# Patient Record
Sex: FEMALE | NOT HISPANIC OR LATINO | URBAN - METROPOLITAN AREA
[De-identification: names, ages, dates, MRNs, and addresses within clinical notes are randomized per-mention and may not be internally consistent; named-entity substitution may affect disease eponyms.]

---

## 2019-12-06 ENCOUNTER — COMMUNICATION - HEALTHEAST (OUTPATIENT)
Dept: SCHEDULING | Facility: CLINIC | Age: 74
End: 2019-12-06

## 2020-01-07 ENCOUNTER — AMBULATORY - HEALTHEAST (OUTPATIENT)
Dept: CARDIAC REHAB | Facility: CLINIC | Age: 75
End: 2020-01-07

## 2020-01-07 DIAGNOSIS — Z95.5 STENTED CORONARY ARTERY: ICD-10-CM

## 2020-01-14 ENCOUNTER — AMBULATORY - HEALTHEAST (OUTPATIENT)
Dept: CARDIAC REHAB | Facility: CLINIC | Age: 75
End: 2020-01-14

## 2020-01-14 DIAGNOSIS — I21.4 NSTEMI (NON-ST ELEVATED MYOCARDIAL INFARCTION) (H): ICD-10-CM

## 2020-01-14 DIAGNOSIS — Z95.5 STENTED CORONARY ARTERY: ICD-10-CM

## 2020-01-14 RX ORDER — MECLIZINE HYDROCHLORIDE 25 MG/1
25 TABLET ORAL 2 TIMES DAILY
Status: SHIPPED | COMMUNITY
Start: 2020-01-14

## 2020-01-21 ENCOUNTER — AMBULATORY - HEALTHEAST (OUTPATIENT)
Dept: CARDIAC REHAB | Facility: CLINIC | Age: 75
End: 2020-01-21

## 2020-01-21 DIAGNOSIS — I21.4 NSTEMI (NON-ST ELEVATED MYOCARDIAL INFARCTION) (H): ICD-10-CM

## 2020-01-21 DIAGNOSIS — Z95.5 STENTED CORONARY ARTERY: ICD-10-CM

## 2020-01-23 ENCOUNTER — AMBULATORY - HEALTHEAST (OUTPATIENT)
Dept: CARDIAC REHAB | Facility: CLINIC | Age: 75
End: 2020-01-23

## 2020-01-23 DIAGNOSIS — Z95.5 STENTED CORONARY ARTERY: ICD-10-CM

## 2020-01-23 DIAGNOSIS — I21.4 NSTEMI (NON-ST ELEVATED MYOCARDIAL INFARCTION) (H): ICD-10-CM

## 2020-01-27 ENCOUNTER — AMBULATORY - HEALTHEAST (OUTPATIENT)
Dept: CARDIAC REHAB | Facility: CLINIC | Age: 75
End: 2020-01-27

## 2020-01-27 DIAGNOSIS — I21.4 NSTEMI (NON-ST ELEVATED MYOCARDIAL INFARCTION) (H): ICD-10-CM

## 2020-01-27 DIAGNOSIS — Z95.5 STENTED CORONARY ARTERY: ICD-10-CM

## 2020-02-05 ENCOUNTER — AMBULATORY - HEALTHEAST (OUTPATIENT)
Dept: CARDIAC REHAB | Facility: CLINIC | Age: 75
End: 2020-02-05

## 2020-02-05 DIAGNOSIS — I21.4 NSTEMI (NON-ST ELEVATED MYOCARDIAL INFARCTION) (H): ICD-10-CM

## 2020-02-05 DIAGNOSIS — Z95.5 STENTED CORONARY ARTERY: ICD-10-CM

## 2021-02-27 ENCOUNTER — RECORDS - HEALTHEAST (OUTPATIENT)
Dept: LAB | Facility: CLINIC | Age: 76
End: 2021-02-27

## 2021-02-28 ENCOUNTER — RECORDS - HEALTHEAST (OUTPATIENT)
Dept: LAB | Facility: CLINIC | Age: 76
End: 2021-02-28

## 2021-03-01 LAB
ALBUMIN UR-MCNC: NEGATIVE MG/DL
APPEARANCE UR: ABNORMAL
BACTERIA #/AREA URNS HPF: ABNORMAL HPF
BILIRUB UR QL STRIP: NEGATIVE
CAOX CRY #/AREA URNS HPF: ABNORMAL /[HPF]
COLOR UR AUTO: YELLOW
GLUCOSE UR STRIP-MCNC: NEGATIVE MG/DL
HGB UR QL STRIP: NEGATIVE
KETONES UR STRIP-MCNC: NEGATIVE MG/DL
LEUKOCYTE ESTERASE UR QL STRIP: ABNORMAL
MUCOUS THREADS #/AREA URNS LPF: ABNORMAL LPF
NITRATE UR QL: NEGATIVE
PH UR STRIP: 8 [PH] (ref 4.5–8)
RBC #/AREA URNS AUTO: ABNORMAL HPF
SP GR UR STRIP: 1.01 (ref 1–1.03)
SQUAMOUS #/AREA URNS AUTO: ABNORMAL LPF
TRI-PHOS CRY #/AREA URNS HPF: PRESENT /[HPF]
UROBILINOGEN UR STRIP-ACNC: ABNORMAL
WBC #/AREA URNS AUTO: ABNORMAL HPF
WBC CLUMPS #/AREA URNS HPF: PRESENT /[HPF]

## 2021-03-02 LAB
ANION GAP SERPL CALCULATED.3IONS-SCNC: 14 MMOL/L (ref 5–18)
BUN SERPL-MCNC: 9 MG/DL (ref 8–28)
CALCIUM SERPL-MCNC: 9.3 MG/DL (ref 8.5–10.5)
CHLORIDE BLD-SCNC: 97 MMOL/L (ref 98–107)
CO2 SERPL-SCNC: 26 MMOL/L (ref 22–31)
CREAT SERPL-MCNC: 0.75 MG/DL (ref 0.6–1.1)
ERYTHROCYTE [DISTWIDTH] IN BLOOD BY AUTOMATED COUNT: 14.3 % (ref 11–14.5)
FERRITIN SERPL-MCNC: 236 NG/ML (ref 10–130)
GFR SERPL CREATININE-BSD FRML MDRD: >60 ML/MIN/1.73M2
GLUCOSE BLD-MCNC: 179 MG/DL (ref 70–125)
HCT VFR BLD AUTO: 29.7 % (ref 35–47)
HGB BLD-MCNC: 9.4 G/DL (ref 12–16)
IRON SERPL-MCNC: 31 UG/DL (ref 42–175)
MCH RBC QN AUTO: 28.1 PG (ref 27–34)
MCHC RBC AUTO-ENTMCNC: 31.6 G/DL (ref 32–36)
MCV RBC AUTO: 89 FL (ref 80–100)
PLATELET # BLD AUTO: 566 THOU/UL (ref 140–440)
PMV BLD AUTO: 9 FL (ref 8.5–12.5)
POTASSIUM BLD-SCNC: 3.1 MMOL/L (ref 3.5–5)
RBC # BLD AUTO: 3.34 MILL/UL (ref 3.8–5.4)
SODIUM SERPL-SCNC: 137 MMOL/L (ref 136–145)
VIT B12 SERPL-MCNC: 591 PG/ML (ref 213–816)
WBC: 14.4 THOU/UL (ref 4–11)

## 2021-03-03 LAB
BACTERIA SPEC CULT: ABNORMAL
BACTERIA SPEC CULT: ABNORMAL

## 2021-03-08 ENCOUNTER — RECORDS - HEALTHEAST (OUTPATIENT)
Dept: LAB | Facility: CLINIC | Age: 76
End: 2021-03-08

## 2021-03-09 LAB
ANION GAP SERPL CALCULATED.3IONS-SCNC: 11 MMOL/L (ref 5–18)
BUN SERPL-MCNC: 10 MG/DL (ref 8–28)
CALCIUM SERPL-MCNC: 8.9 MG/DL (ref 8.5–10.5)
CHLORIDE BLD-SCNC: 103 MMOL/L (ref 98–107)
CO2 SERPL-SCNC: 26 MMOL/L (ref 22–31)
CREAT SERPL-MCNC: 0.57 MG/DL (ref 0.6–1.1)
ERYTHROCYTE [DISTWIDTH] IN BLOOD BY AUTOMATED COUNT: 15 % (ref 11–14.5)
GFR SERPL CREATININE-BSD FRML MDRD: >60 ML/MIN/1.73M2
GLUCOSE BLD-MCNC: 112 MG/DL (ref 70–125)
HCT VFR BLD AUTO: 29.1 % (ref 35–47)
HGB BLD-MCNC: 9 G/DL (ref 12–16)
MCH RBC QN AUTO: 27.8 PG (ref 27–34)
MCHC RBC AUTO-ENTMCNC: 30.9 G/DL (ref 32–36)
MCV RBC AUTO: 90 FL (ref 80–100)
PLATELET # BLD AUTO: 416 THOU/UL (ref 140–440)
PMV BLD AUTO: 9 FL (ref 8.5–12.5)
POTASSIUM BLD-SCNC: 3.8 MMOL/L (ref 3.5–5)
RBC # BLD AUTO: 3.24 MILL/UL (ref 3.8–5.4)
SODIUM SERPL-SCNC: 140 MMOL/L (ref 136–145)
WBC: 7.3 THOU/UL (ref 4–11)

## 2021-06-04 VITALS — WEIGHT: 209 LBS | BODY MASS INDEX: 38.23 KG/M2

## 2021-06-04 VITALS — WEIGHT: 209.7 LBS | BODY MASS INDEX: 38.35 KG/M2

## 2021-06-04 VITALS — BODY MASS INDEX: 38.23 KG/M2 | WEIGHT: 209 LBS

## 2021-06-04 VITALS — BODY MASS INDEX: 37.31 KG/M2 | WEIGHT: 204 LBS

## 2021-06-05 ENCOUNTER — RECORDS - HEALTHEAST (OUTPATIENT)
Dept: MEDSURG UNIT | Facility: CLINIC | Age: 76
End: 2021-06-05

## 2021-06-05 DIAGNOSIS — R07.9 CHEST PAIN: ICD-10-CM

## 2021-06-05 NOTE — PROGRESS NOTES
ITP ASSESSMENT   Assessment Day: 30 Day    Session Number: 7  Precautions: Standard cardiac sx, asthma, low back pain, arthritis    Diagnosis: MI;Stent    Risk Stratification: High    Referring Provider: Camila Bernard MD  EXERCISE  Exercise Assessment: Discharge       Did not complete 6 min walk as pt has been in rehab so few sessions.                         Exercise Plan  Goals Next 30 days  ADL'S: Goal #1: Increase MET level to 2-3 to feel strong enough to return to attending [employer] once/week.    Leisure: Goal #2: Exercise at home 2x/week for 10-15 mins.    Work: Goal #3: Increase MET level to 4-5 to comfortably carry grocery bags and provide some caretaker duties for her  (put on his socks).      Education Goals: Patient can state cardiac s/s and appropriate emergency response.    Education Goals Met: Has system for taking medication.;Medication review.                          Goals Met  Initial ADL's goals met: Goal met- pt has improved to 2.2 METs on Nustep, and has resumed attending [employer] services.    Initial Leisure goals met: Goal met- pt has been exercising 2x/week at home using Nustep, bike, or walking.    Intial Work goals met: Goal not met- pt has not reached peak of 4-5 METs, but is able to put on her husbands     Initial Progression: Pt has been limited by low back pain.  She reached peak of 2.2 METs on Nustep for 25 mins.  She is d/c CR early because of a copay.      Exercise Prescription  Exercise Mode: Bike;Nustep;Arm Erg.;Treadmill;Hallway Walking    Frequency: 2x/week    Duration: 25-40 mins    Intensity / THR: 20-30 beats above resting heart rate    RPE 11-14  Progression / Met level: 2-2.5    Resistive Training?: Yes      Current Exercise (mins/week): 60      Interventions  Home Exercise:  Mode: Walking, biking, Nustep    Frequency: 2-4x/week    Duration: 15-25 mins      Education Material : Educational videos;Provide written material;Individual education and counseling;Offer  educational classes      Education Completed  Exercise Education Completed: Cardiac Anatomy;Signs and Symptoms;Medication review;RPE;Emergency Plan;Home Exercise;Warm up/cool down;FITT Principles;BP/HR Reponse to exercise;Benefits of Exercise;End point of exercise              Exercise Follow-up/Discharge  Follow up/Discharge: Pt has reached peak of            NUTRITION  Nutrition Assessment: Discharge      Nutrition Risk Factors:  Nutrition Risk Factors: Dyslipidemia;Overweight  Cholesterol: 152 (12/2/2019)  LDL: 74  HDL: 58  Triglycerides: 99      Nutrition Plan  Interventions  No data recorded  Other Nutrition Intervention: Therapist/Pt Discussion;Educational Videos;Provide with Written Material      Education Completed  Nutrition Education Completed: Risk factor overview      Goals  Nutrition Goals (Next 30 days): Patient can identify their risk factors for CAD;Patient will follow a low sodium diet;Patient knows appropriate portion size;Patient will lose weight      Goals Met  Nutrition Goals Met: Completed Nutritional Risk Screen;Provided Rate your Plate Survey;Reviewed Dietitian schedule      Height, Weight, and  BMI  Weight: 204 lb (92.5 kg)  Height: 5' (1.524 m)  BMI: 39.84      Nutrition Follow-up  Follow-up/Discharge: Pt is the cook of the household and does the grocery shopping.  Pt has lost about 5 lbs since starting cardiac rehab.  She is exercising more at home.  Pt to try to meet with dietician in the next couple weeks.       Other Risk Factors  Other Risk Factor Assessment: Discharge      HTN Risk Factor: Hypertension      Pre Exercise BP: 112/70  Post Exercise BP: 106/76      Hypertension Plan  Goals  HTN Goals: Patient demonstrates understanding of HTN, no goals identified for the next 30 days      Goals Met  HTN Goals Met: Take medication as prescribed;Follow low sodium diet;Exercises regularly      HTN Interventions  HTN Interventions: Therapist/patient discussion;Provide written material;Offer  educational videos;Offer educational classes      HTN Education Completed  HTN Education Completed: Medication review;Risk factor overview      Tobacco Risk Factor: NA        Risk Factor Follow-up   Follow-up/Discharge: Pt continues to take BP meds and limit her sodium intake.  She has been exercising more at home.         PSYCHOSOCIAL  Psychosocial Assessment: Discharge    Did not complete post-surveys as pt has been here so few sessions.     Psychosocial Risk Factor: NA      Psychosocial Plan  Interventions  Interventions: Offer educational videos and classes;Provide written material;Individual education and counseling      Education Completed  Education Completed: Relaxation/Coping Techniques      Goals  Goals (Next 30 days): Identify stressors;Improvement in [employer] COOP score;Practicing stress management skills      Goals Met  Goals Met: Identified Support system;Oriented to stress management classes      Psychosocial Follow-up  Follow-up/Discharge: Pt continues to deny stress.  She has resumed attending weekly [employer] services.           Patient involved in Goal setting?: Yes      Signature: _____________________________________________________________    Date: __________________    Time: __________________

## 2021-06-05 NOTE — PROGRESS NOTES
Cardiac Rehab  Phase II Assessment    Assessment Date: 1/7/20    Diagnosis: NSTEMI  Date of Onset: 12/1/19  Procedure: PCI with BRAD  Date of Onset: 12/1/19, 12/19/19  ICD/Pacemaker: No Parameters: NA  Post-op Complications: Pt needed a second stent because of some in-stent stenosis from initial stent  ECG History: SR EF%:45-50, per echo 12/2/19  Past Medical History: asthma; GERD; lifetime non-smoker; no DM; spinal stenosis; chronic low back pain; s/p back surgery; s/p TKR (R knee); s/p L knee pain, partial replacement; arthritis    Physical Assessment  Precautions/ Physical Limitations: chronic low back pain  Oxygen: No  O2 Sats: 97 Lung Sounds: clear Edema: none  Incisions: clean/dry/intact  Sleeping Pattern: fair   Appetite: good   Nutrition Risk Screen: Completed.    Pain  Location: chronic low back pain  Characteristics:chronic ache  Intensity: (0-10 scale) 5  Current Pain Management: meds (Tylenol)  Intervention: rest, meds  Response: slightly decrease to 3-4/10    Psychosocial/ Emotional Health  1. In the past 12 months, have you been in a relationship where you have been abused physically, emotionally, sexually or financially? No  notified: NA  2. Who do you turn to for emotional support?:   3. Do you have cultural or spiritual needs? No  4. Have there been any major life changes in the past 12 months? No    Referral Information  Primary Physician: Camila Bernard MD; [facility]  Cardiologist: Dr. Raza  Surgeon: Dr. Jass Craft    Home exercise/Equipment: recumbent bike    Patient's long-term goal(s): Return to pre-procedure activities, establish ex habit    1. Living Accommodations: [facility] Steps: No      Support people at home: no, friends/family members live close by;  lives with pt but he cannot provide physical support as pt is his caregiver   2. Marital Status:   3. Family is able to assist with cares      Restorationist/Community involvement: attends  regularly  4. Recreation/Hobbies: Sewing, crafts

## 2021-06-05 NOTE — PROGRESS NOTES
ITP ASSESSMENT   Assessment Day: Initial    Session Number: 1/2  Precautions: Standard cardiac sx, asthma, low back pain, arthritis    Diagnosis: MI;Stent    Risk Stratification: High    Referring Provider: Camila Bernard MD  EXERCISE  Exercise Assessment: Initial       6 Minute Walk Test   Pre   Pre Exercise HR: 87                    Pre Exercise BP: 130/70      Peak  Peak HR: 129                   Peak BP: 154/66    Peak feet: 600    Peak O2 SAT: 97    Peak RPE: 8    Peak MPH: 1.14      Symptoms:  Peak Symptoms: 4/10 low back pain, SOB, fatigue      5 mins. Post  5 Min Post HR: 88    5 Min Post BP: 120/64                           Exercise Plan  Goals Next 30 days  ADL'S: Goal #1: Increase MET level to 2-3 to feel strong enough to return to attending [employer] once/week.    Leisure: Goal #2: Exercise at home 2x/week for 10-15 mins.    Work: Goal #3: Increase MET level to 4-5 to comfortably carry grocery bags and provide some caretaker duties for her  (put on his socks).      Initial Progression: Pt will be limited by chronic low back pain, obesity.      Education Goals: Patient can state cardiac s/s and appropriate emergency response.    Education Goals Met: Has system for taking medication.;Medication review.      Exercise Prescription  Exercise Mode: Bike;Nustep;Arm Erg.;Treadmill;Hallway Walking    Frequency: 2x/week    Duration: 25-40 mins    Intensity / THR: 20-30 beats above resting heart rate    RPE 11-14  Progression / Met level: 2-2.5    Resistive Training?: Yes      Current Exercise (mins/week): 5      Interventions  Home Exercise:  Mode: Walking, biking    Frequency: 2-3x/week    Duration: 10-15 mins      Education Material : Educational videos;Provide written material;Individual education and counseling;Offer educational classes      Education Completed  Exercise Education Completed: Cardiac Anatomy;Signs and Symptoms;Medication review;RPE;Emergency Plan;Home Exercise;Warm up/cool down;FITT  Principles;BP/HR Reponse to exercise;Benefits of Exercise;End point of exercise              Exercise Follow-up/Discharge  Follow up/Discharge: Skilled therapy-   NUTRITION  Nutrition Assessment: Initial      Nutrition Risk Factors:  Nutrition Risk Factors: Dyslipidemia;Overweight  Cholesterol: 152 (12/2/2019)  LDL: 74  HDL: 58  Triglycerides: 99      Nutrition Plan  Other Nutrition Intervention: Therapist/Pt Discussion;Educational Videos;Provide with Written Material      Education Completed  Nutrition Education Completed: Risk factor overview      Goals  Nutrition Goals (Next 30 days): Patient can identify their risk factors for CAD;Patient will follow a low sodium diet;Patient knows appropriate portion size;Patient will lose weight      Goals Met  Nutrition Goals Met: Completed Nutritional Risk Screen;Provided Rate your Plate Survey;Reviewed Dietitian schedule      Height, Weight, and  BMI  Weight: 209 lb 11.2 oz (95.1 kg)  Height: 5' (1.524 m)  BMI: 40.95      Nutrition Follow-up  Follow-up/Discharge: Pt is the cook of the household and does the grocery shopping.  Diet survey given to pt.  She reports she does not add salt when preparing food (but is not necessarily reading labels to see how much salt is in foods).  She reports eating fruits and vegetables every day.         Other Risk Factors  Other Risk Factor Assessment: Initial      HTN Risk Factor: Hypertension      Pre Exercise BP: 130/70  Post Exercise BP: 120/64      Hypertension Plan  Goals  HTN Goals: Follow low sodium diet;Exercises regularly      Goals Met  HTN Goals Met: Take medication as prescribed      HTN Interventions  HTN Interventions: Therapist/patient discussion;Provide written material;Offer educational videos;Offer educational classes      HTN Education Completed  HTN Education Completed: Medication review;Risk factor overview      Tobacco Risk Factor: NA          Risk Factor Follow-up   Follow-up/Discharge: Pt has not been adding salt to  her food.  She takes meds for HTN.  Pt has been encouraged to exercise regularly at home.     PSYCHOSOCIAL  Psychosocial Assessment: Initial       [facility] COOP Q of L Summary Score: 29      PHQ-9 Total Score: 3      Psychosocial Risk Factor: NA      Psychosocial Plan  Interventions  Interventions: Offer educational videos and classes;Provide written material;Individual education and counseling       Education Completed  Education Completed: Relaxation/Coping Techniques      Goals  Goals (Next 30 days): Identify stressors;Improvement in [facility] COOP score;Practicing stress management skills      Goals Met  Goals Met: Identified Support system;Oriented to stress management classes      Psychosocial Follow-up  Follow-up/Discharge: Pt denies stress.  She reports being spiritual and wanting to return to weekly [employer] services.  She identifies many family members and friends as her support group.             Patient involved in Goal setting?: Yes      Signature: _____________________________________________________________    Date: __________________    Time: __________________

## 2021-06-05 NOTE — PROGRESS NOTES
[facility] Home Exercise Program/Discharge Summary  You have reached a 2.2 MET level and have completed 7 sessions of Cardiac Rehab.   Exercise Goals:   4-6x/week for aerobic exercise 30-60 minutes and 2-3x/week for strength training.   Modality Duration Intensity/  Rate of Perceived Exertion  OMNI Scale (1-10)   Warm-up 5 minutes 2-3   Walk 10-15 mins 4-7   Nustep 15-25 mins Level 3, 70-80 steps/min   Bicycle 15-20 mins 4-7         Cool Down 5 minutes 2-3   Strength Training *every other day 10-15 minutes 1-3 sets of 10 reps  Increase weights as tolerated.   Stretching 5 minutes 2-3   Continuous Exercise Heart Rate Guidelines:   20-30bpm> RHR     Special Recommendations:    Continue to follow low fat, low salt, heart healthy diet.    Continue to follow up with your doctors/providers as recommended (e.g cholesterol).    A well rounded exercise program will included aerobic/cardiovascular exercise (e.g like walking, biking, or swimming ), strength training (e.g. free weights, exercise bands, or weight machines) and stretching program.   Stop Exercise!!! If any of the following occur:    Angina/chest pain    Dizziness    Excessive perspiration/cold sweats    Abnormal shortness of breath    Changes in heart rate (slow, fast, irregular)    Sudden fatigue or numbness    Nausea  Also...    Avoid extreme temperatures - exercise indoors if necessary:   Temp+ Humidity >160, Temp-Wind Chill <20    Wait at least 1 hour after a meal before strenuous activity    Do not exercise if you have a fever or are ill    Wear comfortable, supportive athletic clothing and shoes.  You are now on your way to a heart healthy lifestyle on your own. You can do it!    See Doc Flowsheet

## 2021-06-05 NOTE — PROGRESS NOTES
Emely Diaz has participated in 4 sessions of Phase II Cardiac Rehab.    Progress Report:   Cardiac Rehab Treatment Progress Report 10/16/2014 10/17/2014 1/7/2020 1/14/2020 1/21/2020   Weight 204 lbs 4 oz 204 lbs 2 oz 209 lbs 11 oz 209 lbs 209 lbs   Pre Exercise  HR - - 87 77 73   Pre Exercise BP - - 130/70 122/64 132/72   Nustep Peak Heart Rate - - - 83 78   Nustep Peak Blood Pressure - - - 136/66 152/68   Heart Rate - - 88 74 73   Post Exercise BP - - 120/64 120/68 126/68   ECG - - SR SR SR   Total Exercise Minutes - - 6 25 25         Current Status:  Symptomatic Pt c/o mild chest pain/pressure with ex.  Pain subsides after resting for several mins.  Pt reports she has been limited from exercising at home because of fatigue and chest pain.    If Physician recommends change in treatment plan, please place orders.        __________________________________________________      _____________  Signature                                                                                                  Date

## 2022-02-11 NOTE — TELEPHONE ENCOUNTER
"Speaking with daughter but can hear patient in the background.   Had a heart attack on 12/1.  Stent placed.  Discharged 12/3 evening.  Started having \"randome\" \"subtle\" \"numbness and tingling\" in right arm today.  At times just in fingers.  Other times up to skull.  She does not want to go to the ED at this time.  Daughter is agreeable to rooming with patient tonight so she can monitor closely.  Will call 9-1-1 if need be.  Will call back tomorrow if symptoms persist.  Yumi Garcia RN, BAN, Nurse Advisor, [place] 11p-7a    Reason for Disposition    [1] Age > 40 AND [2] no obvious cause AND [3] pain even when not moving the arm    (Exception: pain is clearly made worse by moving arm or bending neck)    Answer Assessment - Initial Assessment Questions  1. ONSET: \"When did the pain start?\"      About 2 hours ago  2. LOCATION: \"Where is the pain located?\"      Finger tips and sometimes up to skull  3. PAIN: \"How bad is the pain?\" (Scale 1-10; or mild, moderate, severe)    - MILD (1-3): doesn't interfere with normal activities    - MODERATE (4-7): interferes with normal activities (e.g., work or school) or awakens from sleep    - SEVERE (8-10): excruciating pain, unable to do any normal activities, unable to hold a cup of water  \"subtle\" mild to moderate     4. WORK OR EXERCISE: \"Has there been any recent work or exercise that involved this part of the body?\"      Recent heart attack with stent  5. CAUSE: \"What do you think is causing the arm pain?\"      Unsure.  It feels like she \"slept on it and now it is coming back\"  6. OTHER SYMPTOMS: \"Do you have any other symptoms?\" (e.g., neck pain, swelling, rash, fever, numbness, weakness)      Denies any other symptom  7. PREGNANCY: \"Is there any chance you are pregnant?\" \"When was your last menstrual period?\"      N/A    Protocols used: ARM PAIN-A-AH      "
unknown